# Patient Record
Sex: FEMALE | Race: BLACK OR AFRICAN AMERICAN | Employment: OTHER | URBAN - METROPOLITAN AREA
[De-identification: names, ages, dates, MRNs, and addresses within clinical notes are randomized per-mention and may not be internally consistent; named-entity substitution may affect disease eponyms.]

---

## 2018-03-22 PROBLEM — E66.01 OBESITY, MORBID (HCC): Status: ACTIVE | Noted: 2018-03-22

## 2019-06-11 ENCOUNTER — HOSPITAL ENCOUNTER (EMERGENCY)
Age: 39
Discharge: HOME | End: 2019-06-11
Attending: EMERGENCY MEDICINE
Payer: OTHER MISCELLANEOUS

## 2019-06-11 ENCOUNTER — APPOINTMENT (OUTPATIENT)
Dept: GENERAL RADIOLOGY | Age: 39
End: 2019-06-11
Attending: EMERGENCY MEDICINE
Payer: OTHER MISCELLANEOUS

## 2019-06-11 VITALS
OXYGEN SATURATION: 99 % | WEIGHT: 293 LBS | BODY MASS INDEX: 41.02 KG/M2 | SYSTOLIC BLOOD PRESSURE: 132 MMHG | TEMPERATURE: 98 F | RESPIRATION RATE: 16 BRPM | HEART RATE: 82 BPM | HEIGHT: 71 IN | DIASTOLIC BLOOD PRESSURE: 82 MMHG

## 2019-06-11 DIAGNOSIS — S90.31XA CONTUSION OF RIGHT FOOT, INITIAL ENCOUNTER: Primary | ICD-10-CM

## 2019-06-11 DIAGNOSIS — R20.2 PARESTHESIA: ICD-10-CM

## 2019-06-11 PROCEDURE — 73630 X-RAY EXAM OF FOOT: CPT

## 2019-06-11 PROCEDURE — 99283 EMERGENCY DEPT VISIT LOW MDM: CPT | Performed by: EMERGENCY MEDICINE

## 2019-06-11 NOTE — DISCHARGE INSTRUCTIONS
Alternate 4 ibuprofen every 8 hours with 2 extra-strength tylenol every 6 hours    Patient Education        Contusion: Care Instructions  Your Care Instructions    Contusion is the medical term for a bruise. It is the result of a direct blow or an impact, such as a fall. Contusions are common sports injuries. Most people think of a bruise as a black-and-blue spot. This happens when small blood vessels get torn and leak blood under the skin. But bones, muscles, and organs can also get bruised. This may damage deep tissues but not cause a bruise you can see. The doctor will do a physical exam to find the location of your contusion. You may also have tests to make sure you do not have a more serious injury, such as a broken bone or nerve damage. These may include X-rays or other imaging tests like a CT scan or MRI. Deep-tissue contusions may cause pain and swelling. But if there is no serious damage, they will often get better in a few weeks with home treatment. The doctor has checked you carefully, but problems can develop later. If you notice any problems or new symptoms, get medical treatment right away. Follow-up care is a key part of your treatment and safety. Be sure to make and go to all appointments, and call your doctor if you are having problems. It's also a good idea to know your test results and keep a list of the medicines you take. How can you care for yourself at home? · Put ice or a cold pack on the sore area for 10 to 20 minutes at a time to stop swelling. Put a thin cloth between the ice pack and your skin. · Be safe with medicines. Read and follow all instructions on the label. ? If the doctor gave you a prescription medicine for pain, take it as prescribed. ? If you are not taking a prescription pain medicine, ask your doctor if you can take an over-the-counter medicine. · If you can, prop up the sore area on pillows as much as possible for the next few days.  Try to keep the sore area above the level of your heart. When should you call for help? Call your doctor now or seek immediate medical care if:    · Your pain gets worse.     · You have new or worse swelling.     · You have tingling, weakness, or numbness in the area near the contusion.     · The area near the contusion is cold or pale.    Watch closely for changes in your health, and be sure to contact your doctor if:    · You do not get better as expected. Where can you learn more? Go to http://leslye-dorian.info/. Enter J274 in the search box to learn more about \"Contusion: Care Instructions. \"  Current as of: September 23, 2018  Content Version: 11.9  © 0870-7340 [employer]. Care instructions adapted under license by [employer] (which disclaims liability or warranty for this information). If you have questions about a medical condition or this instruction, always ask your healthcare professional. [address] any warranty or liability for your use of this information. Patient Education        Numbness and Tingling: Care Instructions  Your Care Instructions    Many things can cause numbness or tingling. Swelling may put pressure on a nerve. This could cause you to lose feeling or have a pins-and-needles sensation on part of your body. Nerves may be damaged from trauma, toxins, or diseases, such as diabetes or multiple sclerosis (MS). Sometimes, though, the cause is not clear. If there is no clear reason for your symptoms, and you are not having any other symptoms, your doctor may suggest watching and waiting for a while to see if the numbness or tingling goes away on its own. Your doctor may want you to have blood or nerve tests to find the cause of your symptoms. Follow-up care is a key part of your treatment and safety. Be sure to make and go to all appointments, and call your doctor if you are having problems.  It's also a good idea to know your test results and keep a list of the medicines you take. How can you care for yourself at home? · If your doctor prescribes medicine, take it exactly as directed. Call your doctor if you think you are having a problem with your medicine. · If you have any swelling, put ice or a cold pack on the area for 10 to 20 minutes at a time. Put a thin cloth between the ice and your skin. When should you call for help? Call 911 anytime you think you may need emergency care. For example, call if:    · You have weakness, numbness, or tingling in both legs.     · You lose bowel or bladder control.     · You have symptoms of a stroke. These may include:  ? Sudden numbness, tingling, weakness, or loss of movement in your face, arm, or leg, especially on only one side of your body. ? Sudden vision changes. ? Sudden trouble speaking. ? Sudden confusion or trouble understanding simple statements. ? Sudden problems with walking or balance. ? A sudden, severe headache that is different from past headaches.    Watch closely for changes in your health, and be sure to contact your doctor if you have any problems, or if:    · You do not get better as expected. Where can you learn more? Go to http://leslye-dorian.info/. Enter W761 in the search box to learn more about \"Numbness and Tingling: Care Instructions. \"  Current as of: Renu 3, 2018  Content Version: 11.9  © 1169-4905 [employer]. Care instructions adapted under license by [employer] (which disclaims liability or warranty for this information). If you have questions about a medical condition or this instruction, always ask your healthcare professional. [address] any warranty or liability for your use of this information.

## 2019-06-11 NOTE — ED TRIAGE NOTES
Pt to ED c/o \"dropping a 40lb bag of medicine\" on her foot around 0900 today. Able to ambulate but with pain. Denies swelling but states \"it started getting numb and warm. \" Denies previous surgeries to affected foot.

## 2019-06-11 NOTE — ED NOTES
I have reviewed discharge instructions with the patient. The patient verbalized understanding. Patient left ED via Discharge Method: ambulatory to Home with self. Opportunity for questions and clarification provided. Patient given 0 scripts. To continue your aftercare when you leave the hospital, you may receive an automated call from our care team to check in on how you are doing. This is a free service and part of our promise to provide the best care and service to meet your aftercare needs.  If you have questions, or wish to unsubscribe from this service please call [phone]. Thank you for Choosing our [facility] Emergency Department.

## 2019-06-15 NOTE — ED PROVIDER NOTES
Chief complaint : Foot pain    HISTORY OF PRESENT ILLNESS :  Location : Right Dorsolateral    Quality : Throbbing    Quantity :     Timing : Just prior to arrival    Severity : Mild    Alleviating / exacerbating factors : Worse with ambulation    Associated Symptoms : Mild numbness           Past Medical History:   Diagnosis Date    Elevated blood pressure (not hypertension) 3/12/2014    Elevated blood pressure reading without diagnosis of hypertension 3/12/2014    HDL deficiency 2014    Recurrent respiratory infection 2015    Weight gain 3/12/2014       Past Surgical History:   Procedure Laterality Date    BREAST SURGERY PROCEDURE UNLISTED  2007    reduction    HX GYN   &         HX TUBAL LIGATION  2010         Family History:   Problem Relation Age of Onset    Hypertension Mother     Thyroid Disease Mother     Hypertension Maternal Grandmother     Elevated Lipids Maternal Grandmother     Diabetes Maternal Grandmother        Social History     Socioeconomic History    Marital status:      Spouse name: Not on file    Number of children: Not on file    Years of education: Not on file    Highest education level: Not on file   Occupational History    Not on file   Social Needs    Financial resource strain: Not on file    Food insecurity:     Worry: Not on file     Inability: Not on file    Transportation needs:     Medical: Not on file     Non-medical: Not on file   Tobacco Use    Smoking status: Never Smoker    Smokeless tobacco: Never Used   Substance and Sexual Activity    Alcohol use:  Yes     Alcohol/week: 0.0 - 0.5 oz    Drug use: No    Sexual activity: Not on file   Lifestyle    Physical activity:     Days per week: Not on file     Minutes per session: Not on file    Stress: Not on file   Relationships    Social connections:     Talks on phone: Not on file     Gets together: Not on file     Attends Denominational service: Not on file     Active member of club or organization: Not on file     Attends meetings of clubs or organizations: Not on file     Relationship status: Not on file    Intimate partner violence:     Fear of current or ex partner: Not on file     Emotionally abused: Not on file     Physically abused: Not on file     Forced sexual activity: Not on file   Other Topics Concern    Not on file   Social History Narrative    Not on file         ALLERGIES: Shellfish containing products    Review of Systems   Musculoskeletal: Positive for arthralgias. Negative for joint swelling. Neurological: Positive for numbness. Negative for weakness. Vitals:    06/11/19 1043 06/11/19 1127   BP: 138/84 132/82   Pulse: 84 82   Resp: 16 16   Temp: 98.2 °F (36.8 °C) 98 °F (36.7 °C)   SpO2: 97% 99%   Weight: 146.1 kg (322 lb)    Height: 5' 11\" (1.803 m)             Physical Exam   Constitutional: She is oriented to person, place, and time. She appears well-developed and well-nourished. No distress. HENT:   Head: Normocephalic and atraumatic. Eyes: Conjunctivae and EOM are normal. Right eye exhibits no discharge. Left eye exhibits no discharge. Neck: Normal range of motion. Neck supple. Pulmonary/Chest: Effort normal. No respiratory distress. Musculoskeletal: Normal range of motion. She exhibits tenderness. She exhibits no deformity. Right foot: There is tenderness. Feet:    Neurological: She is alert and oriented to person, place, and time. She has normal strength. She exhibits normal muscle tone. cni 2-12 grossly  Nl gait,  Nl speech     Skin: Skin is warm and dry. No rash noted. She is not diaphoretic. Psychiatric: She has a normal mood and affect. Her behavior is normal.   Nursing note and vitals reviewed.        MDM  Number of Diagnoses or Management Options  Contusion of right foot, initial encounter:   Paresthesia:   Diagnosis management comments: Medical decision making note:  Right foot pain  Negative x-ray  Suspect contusion  Anticipate paresthesia should resolve spontaneously  This concludes the \"medical decision making note\" part of this emergency department visit note. Amount and/or Complexity of Data Reviewed  Tests in the radiology section of CPT®: reviewed and ordered (XR FOOT RT MIN 3 V   Final Result    IMPRESSION: Soft tissue swelling.  Small ossicle or avulsion injury along the anterior process of the calcaneus.     )           Procedures

## 2019-07-10 ENCOUNTER — HOSPITAL ENCOUNTER (EMERGENCY)
Age: 39
Discharge: HOME | End: 2019-07-10
Attending: EMERGENCY MEDICINE
Payer: COMMERCIAL

## 2019-07-10 ENCOUNTER — APPOINTMENT (OUTPATIENT)
Dept: CT IMAGING | Age: 39
End: 2019-07-10
Payer: COMMERCIAL

## 2019-07-10 VITALS
TEMPERATURE: 99.9 F | HEIGHT: 71 IN | RESPIRATION RATE: 16 BRPM | OXYGEN SATURATION: 98 % | WEIGHT: 293 LBS | HEART RATE: 78 BPM | DIASTOLIC BLOOD PRESSURE: 72 MMHG | SYSTOLIC BLOOD PRESSURE: 126 MMHG | BODY MASS INDEX: 41.02 KG/M2

## 2019-07-10 DIAGNOSIS — B34.9 VIRAL SYNDROME: Primary | ICD-10-CM

## 2019-07-10 LAB
ALBUMIN SERPL-MCNC: 3.6 G/DL (ref 3.5–5)
ALBUMIN/GLOB SERPL: 0.9 {RATIO} (ref 1.2–3.5)
ALP SERPL-CCNC: 42 U/L (ref 50–136)
ALT SERPL-CCNC: 148 U/L (ref 12–65)
ANION GAP SERPL CALC-SCNC: 6 MMOL/L (ref 7–16)
AST SERPL-CCNC: 95 U/L (ref 15–37)
BASOPHILS # BLD: 0 K/UL (ref 0–0.2)
BASOPHILS NFR BLD: 0 % (ref 0–2)
BILIRUB SERPL-MCNC: 0.4 MG/DL (ref 0.2–1.1)
BUN SERPL-MCNC: 12 MG/DL (ref 6–23)
CALCIUM SERPL-MCNC: 9.1 MG/DL (ref 8.3–10.4)
CHLORIDE SERPL-SCNC: 103 MMOL/L (ref 98–107)
CO2 SERPL-SCNC: 27 MMOL/L (ref 21–32)
CREAT SERPL-MCNC: 0.97 MG/DL (ref 0.6–1)
CRP SERPL-MCNC: 0.4 MG/DL (ref 0–0.9)
DEPRECATED S PYO AG THROAT QL EIA: NEGATIVE
DIFFERENTIAL METHOD BLD: ABNORMAL
EOSINOPHIL # BLD: 0.1 K/UL (ref 0–0.8)
EOSINOPHIL NFR BLD: 4 % (ref 0.5–7.8)
ERYTHROCYTE [DISTWIDTH] IN BLOOD BY AUTOMATED COUNT: 13.2 % (ref 11.9–14.6)
ERYTHROCYTE [SEDIMENTATION RATE] IN BLOOD: 41 MM/HR (ref 0–20)
FLUAV AG NPH QL IA: NEGATIVE
FLUBV AG NPH QL IA: NEGATIVE
GLOBULIN SER CALC-MCNC: 4.1 G/DL (ref 2.3–3.5)
GLUCOSE SERPL-MCNC: 77 MG/DL (ref 65–100)
HCG UR QL: NEGATIVE
HCT VFR BLD AUTO: 36.4 % (ref 35.8–46.3)
HGB BLD-MCNC: 12 G/DL (ref 11.7–15.4)
IMM GRANULOCYTES # BLD AUTO: 0 K/UL (ref 0–0.5)
IMM GRANULOCYTES NFR BLD AUTO: 0 % (ref 0–5)
LACTATE BLD-SCNC: 0.64 MMOL/L (ref 0.5–1.9)
LYMPHOCYTES # BLD: 1.1 K/UL (ref 0.5–4.6)
LYMPHOCYTES NFR BLD: 32 % (ref 13–44)
MCH RBC QN AUTO: 32.1 PG (ref 26.1–32.9)
MCHC RBC AUTO-ENTMCNC: 33 G/DL (ref 31.4–35)
MCV RBC AUTO: 97.3 FL (ref 79.6–97.8)
MONOCYTES # BLD: 0.5 K/UL (ref 0.1–1.3)
MONOCYTES NFR BLD: 14 % (ref 4–12)
NEUTS SEG # BLD: 1.6 K/UL (ref 1.7–8.2)
NEUTS SEG NFR BLD: 50 % (ref 43–78)
NRBC # BLD: 0 K/UL (ref 0–0.2)
PLATELET # BLD AUTO: 252 K/UL (ref 150–450)
PLATELET COMMENTS,PCOM: ADEQUATE
PMV BLD AUTO: 9.9 FL (ref 9.4–12.3)
POTASSIUM SERPL-SCNC: 3.8 MMOL/L (ref 3.5–5.1)
PROT SERPL-MCNC: 7.7 G/DL (ref 6.3–8.2)
RBC # BLD AUTO: 3.74 M/UL (ref 4.05–5.2)
RBC MORPH BLD: ABNORMAL
SODIUM SERPL-SCNC: 136 MMOL/L (ref 136–145)
SPECIMEN SOURCE: NORMAL
WBC # BLD AUTO: 3.3 K/UL (ref 4.3–11.1)
WBC MORPH BLD: ABNORMAL

## 2019-07-10 PROCEDURE — 87880 STREP A ASSAY W/OPTIC: CPT

## 2019-07-10 PROCEDURE — 85652 RBC SED RATE AUTOMATED: CPT

## 2019-07-10 PROCEDURE — 80053 COMPREHEN METABOLIC PANEL: CPT

## 2019-07-10 PROCEDURE — 81003 URINALYSIS AUTO W/O SCOPE: CPT | Performed by: EMERGENCY MEDICINE

## 2019-07-10 PROCEDURE — 87081 CULTURE SCREEN ONLY: CPT

## 2019-07-10 PROCEDURE — 85025 COMPLETE CBC W/AUTO DIFF WBC: CPT

## 2019-07-10 PROCEDURE — 99285 EMERGENCY DEPT VISIT HI MDM: CPT | Performed by: EMERGENCY MEDICINE

## 2019-07-10 PROCEDURE — 81025 URINE PREGNANCY TEST: CPT

## 2019-07-10 PROCEDURE — 87804 INFLUENZA ASSAY W/OPTIC: CPT

## 2019-07-10 PROCEDURE — 96360 HYDRATION IV INFUSION INIT: CPT | Performed by: EMERGENCY MEDICINE

## 2019-07-10 PROCEDURE — 74011250637 HC RX REV CODE- 250/637: Performed by: PHYSICIAN ASSISTANT

## 2019-07-10 PROCEDURE — 74011250636 HC RX REV CODE- 250/636: Performed by: PHYSICIAN ASSISTANT

## 2019-07-10 PROCEDURE — 70450 CT HEAD/BRAIN W/O DYE: CPT

## 2019-07-10 PROCEDURE — 83605 ASSAY OF LACTIC ACID: CPT

## 2019-07-10 PROCEDURE — 86140 C-REACTIVE PROTEIN: CPT

## 2019-07-10 RX ORDER — BUTALBITAL, ACETAMINOPHEN AND CAFFEINE 50; 325; 40 MG/1; MG/1; MG/1
1 TABLET ORAL
Qty: 40 TAB | Refills: 0 | Status: SHIPPED | OUTPATIENT
Start: 2019-07-10

## 2019-07-10 RX ORDER — SODIUM CHLORIDE 9 MG/ML
1000 INJECTION, SOLUTION INTRAVENOUS ONCE
Status: COMPLETED | OUTPATIENT
Start: 2019-07-10 | End: 2019-07-10

## 2019-07-10 RX ORDER — IBUPROFEN 800 MG/1
800 TABLET ORAL
Status: COMPLETED | OUTPATIENT
Start: 2019-07-10 | End: 2019-07-10

## 2019-07-10 RX ORDER — IBUPROFEN 800 MG/1
600 TABLET ORAL
Qty: 20 TAB | Refills: 0 | Status: SHIPPED | OUTPATIENT
Start: 2019-07-10

## 2019-07-10 RX ORDER — METHYLPREDNISOLONE 4 MG/1
4 TABLET ORAL
Qty: 1 DOSE PACK | Refills: 0 | Status: SHIPPED | OUTPATIENT
Start: 2019-07-10 | End: 2019-08-20 | Stop reason: ALTCHOICE

## 2019-07-10 RX ORDER — BUTALBITAL, ACETAMINOPHEN AND CAFFEINE 50; 325; 40 MG/1; MG/1; MG/1
2 TABLET ORAL
Status: COMPLETED | OUTPATIENT
Start: 2019-07-10 | End: 2019-07-10

## 2019-07-10 RX ADMIN — IBUPROFEN 800 MG: 800 TABLET, FILM COATED ORAL at 12:14

## 2019-07-10 RX ADMIN — BUTALBITAL, ACETAMINOPHEN AND CAFFEINE 2 TABLET: 50; 325; 40 TABLET ORAL at 12:14

## 2019-07-10 RX ADMIN — SODIUM CHLORIDE 1000 ML: 900 INJECTION, SOLUTION INTRAVENOUS at 12:14

## 2019-07-10 NOTE — ED NOTES
I have reviewed discharge instructions with the patient. The patient verbalized understanding. Patient left ED via Discharge Method: ambulatory to Home with self    Opportunity for questions and clarification provided. Patient given 3 scripts. To continue your aftercare when you leave the hospital, you may receive an automated call from our care team to check in on how you are doing. This is a free service and part of our promise to provide the best care and service to meet your aftercare needs.  If you have questions, or wish to unsubscribe from this service please call [phone]. Thank you for Choosing our [facility] Emergency Department.

## 2019-07-10 NOTE — DISCHARGE INSTRUCTIONS
Patient Education        Viral Infections: Care Instructions  Your Care Instructions    You don't feel well, but it's not clear what's causing it. You may have a viral infection. Viruses cause many illnesses, such as the common cold, influenza, fever, rashes, and the diarrhea, nausea, and vomiting that are often called \"stomach flu. \" You may wonder if antibiotic medicines could make you feel better. But antibiotics only treat infections caused by bacteria. They don't work on viruses. The good news is that viral infections usually aren't serious. Most will go away in a few days without medical treatment. In the meantime, there are a few things you can do to make yourself more comfortable. Follow-up care is a key part of your treatment and safety. Be sure to make and go to all appointments, and call your doctor if you are having problems. It's also a good idea to know your test results and keep a list of the medicines you take. How can you care for yourself at home? · Get plenty of rest if you feel tired. · Take an over-the-counter pain medicine if needed, such as acetaminophen (Tylenol), ibuprofen (Advil, Motrin), or naproxen (Aleve). Read and follow all instructions on the label. · Be careful when taking over-the-counter cold or flu medicines and Tylenol at the same time. Many of these medicines have acetaminophen, which is Tylenol. Read the labels to make sure that you are not taking more than the recommended dose. Too much acetaminophen (Tylenol) can be harmful. · Drink plenty of fluids, enough so that your urine is light yellow or clear like water. If you have kidney, heart, or liver disease and have to limit fluids, talk with your doctor before you increase the amount of fluids you drink. · Stay home from work, school, and other public places while you have a fever. When should you call for help? Call 911 anytime you think you may need emergency care.  For example, call if:    · You have severe trouble breathing.     · You passed out (lost consciousness).    Call your doctor now or seek immediate medical care if:    · You seem to be getting much sicker.     · You have a new or higher fever.     · You have blood in your stools.     · You have new belly pain, or your pain gets worse.     · You have a new rash.    Watch closely for changes in your health, and be sure to contact your doctor if:    · You start to get better and then get worse.     · You do not get better as expected. Where can you learn more? Go to http://leslye-dorian.info/. Enter V099 in the search box to learn more about \"Viral Infections: Care Instructions. \"  Current as of: July 30, 2018  Content Version: 11.9  © 1698-6702 [employer]. Care instructions adapted under license by [employer] (which disclaims liability or warranty for this information). If you have questions about a medical condition or this instruction, always ask your healthcare professional. [place] [address] any warranty or liability for your use of this information. Patient Education   Headache: After Your Visit to the Emergency Room  Your Care Instructions  Headaches have many possible causes. Most headaches are not a sign of serious problems and will get better on their own. Home treatment may help you feel better soon. Even though you have been released from the emergency room, you still need to watch for any problems. The doctor carefully checked you. But sometimes problems can develop later. If you have new symptoms, or if your symptoms do not get better, return to the emergency room or call your doctor right away. A visit to the emergency room is only one step in your treatment. Even if you feel better, you still need to do what your doctor recommends, such as going to all suggested follow-up appointments and taking medicines exactly as directed.  This will help you recover and help prevent future problems. How can you care for yourself at home? · Have someone drive you home if you have taken pain medicine. Do not drive yourself. · Rest in a quiet, dark room until your headache is gone. Close your eyes, and try to relax or go to sleep. Do not watch TV or read. · Put ice or a cold pack on the area for 10 to 20 minutes at a time. Put a thin cloth between the ice and your skin. · Use a warm, moist towel or a heating pad set on low to relax tight shoulder and neck muscles. · Have someone gently massage your neck and shoulders. · Take pain medicines exactly as directed. ¨ If the doctor gave you a prescription medicine for pain, take it as prescribed. ¨ If you are not taking a prescription pain medicine, ask your doctor if you can take an over-the-counter medicine. · Limit your caffeine intake to 1 to 2 cups a day. People who drink a lot of caffeine often get a headache several hours after their last drink of caffeine. Or they may wake up with a headache that is relieved by drinking caffeine. Cut down slowly to avoid caffeine-withdrawal headaches. · Seek help if you have depression or anxiety. Your headaches may be linked to these conditions. When should you call for help? Call 911 if:  · You have signs of a stroke. These may include:  ¨ Sudden numbness, paralysis, or weakness in your face, arm, or leg, especially on only one side of your body. ¨ New problems with walking or balance. ¨ Sudden vision changes. ¨ Drooling or slurred speech. ¨ New problems speaking or understanding simple statements, or feeling confused. ¨ A sudden, severe headache that is different from past headaches. · You have other symptoms that you think are a medical emergency. Return to the emergency room now if:  · You get a fever and a stiff neck. · Your headache gets worse. · You have new nausea and vomiting, or you cannot keep down food or liquids.   Call your doctor today if:  · Your headache does not get better within 1 to 2 days. · Your headaches get worse or happen more often. Where can you learn more? Go to MashMe.TV.be  Enter D038 in the search box to learn more about \"Headache: After Your Visit to the Emergency Room. \"   © 6959-6890 Healthwise, Incorporated. Care instructions adapted under license by [facility] (which disclaims liability or warranty for this information). This care instruction is for use with your licensed healthcare professional. If you have questions about a medical condition or this instruction, always ask your healthcare professional. [address] any warranty or liability for your use of this information.   Content Version: 9.3.26251; Last Revised: October 20, 2011

## 2019-07-10 NOTE — LETTER
[facility] EMERGENCY DEPT 
[address] [place] [address] [place] [id number] 
[phone] Work/School Note Date: 7/10/2019 To Whom It May concern: 
 
Roberto Ocampo was seen and treated today in the emergency room by the following provider(s): 
Attending Provider: Angel Luis Baires MD 
Physician Assistant: ESTHER Miller. Roberto Ocampo may return to work on 07/15/19. Sincerely, ESTHER Steve

## 2019-07-10 NOTE — ED PROVIDER NOTES
Patient is here with body aches, right-sided headache, pressure behind right eye, back pain and myalgias that started on . She went to an urgent care on Monday where they told her she had a migraine and gave her 2 different shots(kenalog and toradol), which seemed to help a little bit. She felt a little better yesterday but today she feels the same. She has not had any visual changes, nausea, vomiting, trouble speaking, chest pain, shortness of breath, abdominal pain. She has been urinating frequently. She has no medical problems, swelling to the arms or legs, dyspnea on exertion, orthopnea, or other new symptoms. She was ambulatory to the stretcher without difficulty and well-hydrated. The history is provided by the patient. Generalized Body Aches   This is a new problem. The current episode started more than 2 days ago (3 days). The problem has not changed since onset. Associated symptoms include headaches. Pertinent negatives include no chest pain, no abdominal pain and no shortness of breath. Nothing aggravates the symptoms. Nothing relieves the symptoms. Treatments tried: 2 shots, 800 mg ibuprofen.         Past Medical History:   Diagnosis Date    Elevated blood pressure (not hypertension) 3/12/2014    Elevated blood pressure reading without diagnosis of hypertension 3/12/2014    HDL deficiency 2014    Recurrent respiratory infection 2015    Weight gain 3/12/2014       Past Surgical History:   Procedure Laterality Date    BREAST SURGERY PROCEDURE UNLISTED      reduction    HX GYN   &         HX TUBAL LIGATION  2010         Family History:   Problem Relation Age of Onset    Hypertension Mother     Thyroid Disease Mother     Hypertension Maternal Grandmother     Elevated Lipids Maternal Grandmother     Diabetes Maternal Grandmother        Social History     Socioeconomic History    Marital status:      Spouse name: Not on file    Number of children: Not on file    Years of education: Not on file    Highest education level: Not on file   Occupational History    Not on file   Social Needs    Financial resource strain: Not on file    Food insecurity:     Worry: Not on file     Inability: Not on file    Transportation needs:     Medical: Not on file     Non-medical: Not on file   Tobacco Use    Smoking status: Never Smoker    Smokeless tobacco: Never Used   Substance and Sexual Activity    Alcohol use: Yes     Alcohol/week: 0.0 - 0.5 oz    Drug use: No    Sexual activity: Not on file   Lifestyle    Physical activity:     Days per week: Not on file     Minutes per session: Not on file    Stress: Not on file   Relationships    Social connections:     Talks on phone: Not on file     Gets together: Not on file     Attends Latter day service: Not on file     Active member of club or organization: Not on file     Attends meetings of clubs or organizations: Not on file     Relationship status: Not on file    Intimate partner violence:     Fear of current or ex partner: Not on file     Emotionally abused: Not on file     Physically abused: Not on file     Forced sexual activity: Not on file   Other Topics Concern    Not on file   Social History Narrative    Not on file         ALLERGIES: Shellfish containing products    Review of Systems   Constitutional: Positive for chills. HENT: Positive for sore throat. Eyes: Negative. Respiratory: Negative. Negative for shortness of breath. Cardiovascular: Negative. Negative for chest pain. Gastrointestinal: Negative. Negative for abdominal pain. Genitourinary: Positive for urgency. Negative for dysuria. Musculoskeletal: Positive for back pain. Skin: Negative. Neurological: Positive for headaches. Psychiatric/Behavioral: Negative. All other systems reviewed and are negative.       Vitals:    07/10/19 1000   BP: 138/80   Pulse: 90   Resp: 16   Temp: 99.9 °F (37.7 °C)   SpO2: 97% Weight: 149.7 kg (330 lb)   Height: 5' 11\" (1.803 m)            Physical Exam   Constitutional: She is oriented to person, place, and time. She appears well-developed and well-nourished. HENT:   Head: Normocephalic and atraumatic. Right Ear: External ear normal.   Left Ear: External ear normal.   Nose: Nose normal.   Mouth/Throat: Oropharynx is clear and moist.   Eyes: Pupils are equal, round, and reactive to light. Conjunctivae, EOM and lids are normal. Lids are everted and swept, no foreign bodies found. Neck: Normal range of motion. Neck supple. Cardiovascular: Normal rate, regular rhythm, normal heart sounds and intact distal pulses. Pulmonary/Chest: Effort normal and breath sounds normal.   Abdominal: Soft. Bowel sounds are normal.   Musculoskeletal: Normal range of motion. Back:    Neurological: She is alert and oriented to person, place, and time. She has normal strength and normal reflexes. No cranial nerve deficit or sensory deficit. She displays a negative Romberg sign. GCS eye subscore is 4. GCS verbal subscore is 5. GCS motor subscore is 6. Reflex Scores:       Tricep reflexes are 2+ on the right side and 2+ on the left side. Bicep reflexes are 2+ on the right side and 2+ on the left side. Brachioradialis reflexes are 2+ on the right side and 2+ on the left side. Patellar reflexes are 2+ on the right side and 2+ on the left side. Achilles reflexes are 2+ on the right side and 2+ on the left side. Skin: Skin is warm and dry. Psychiatric: She has a normal mood and affect. Her behavior is normal. Judgment and thought content normal.   Nursing note and vitals reviewed.        MDM  Number of Diagnoses or Management Options  Viral syndrome:      Amount and/or Complexity of Data Reviewed  Clinical lab tests: ordered and reviewed  Tests in the radiology section of CPT®: ordered and reviewed    Risk of Complications, Morbidity, and/or Mortality  Presenting problems: moderate  Diagnostic procedures: moderate  Management options: moderate    Patient Progress  Patient progress: improved         Procedures      1:18 PM Spoke with Dr. Yoni Bright regarding patient. We discussed the patients history, physical exam, labs and CT findings. Patient appears to have a viral syndrome at this time. She is feeling better after medication. I will send her home with ibuprofen and fioricet. 3:02 PM Spoke with Dr. Viral Reilly, Ophthalmologist, regarding the patient. We discussed the history, physical exam, lab and CT findings. He states they can see her next week in the office for follow-up. He does not believe that this appears to be carotid arteritis. Patient does appear to have a viral infection. She is stable for discharge at this time and was told to return to the ED if worsening in any way. The patient was observed in the ED.     Results Reviewed:      Recent Results (from the past 24 hour(s))   STREP AG SCREEN, GROUP A    Collection Time: 07/10/19 10:28 AM   Result Value Ref Range    Group A Strep Ag ID NEGATIVE  NEG     INFLUENZA A & B AG (RAPID TEST)    Collection Time: 07/10/19 10:30 AM   Result Value Ref Range    Influenza A Ag NEGATIVE  NEG      Influenza B Ag NEGATIVE  NEG      Source NASOPHARYNGEAL     HCG URINE, QL. - POC    Collection Time: 07/10/19 10:36 AM   Result Value Ref Range    Pregnancy test,urine (POC) NEGATIVE  NEG     CBC WITH AUTOMATED DIFF    Collection Time: 07/10/19 12:20 PM   Result Value Ref Range    WBC 3.3 (L) 4.3 - 11.1 K/uL    RBC 3.74 (L) 4.05 - 5.2 M/uL    HGB 12.0 11.7 - 15.4 g/dL    HCT 36.4 35.8 - 46.3 %    MCV 97.3 79.6 - 97.8 FL    MCH 32.1 26.1 - 32.9 PG    MCHC 33.0 31.4 - 35.0 g/dL    RDW 13.2 11.9 - 14.6 %    PLATELET 063 810 - 525 K/uL    MPV 9.9 9.4 - 12.3 FL    ABSOLUTE NRBC 0.00 0.0 - 0.2 K/uL    NEUTROPHILS 50 43 - 78 %    LYMPHOCYTES 32 13 - 44 %    MONOCYTES 14 (H) 4.0 - 12.0 %    EOSINOPHILS 4 0.5 - 7.8 %    BASOPHILS 0 0.0 - 2.0 % IMMATURE GRANULOCYTES 0 0.0 - 5.0 %    ABS. NEUTROPHILS 1.6 (L) 1.7 - 8.2 K/UL    ABS. LYMPHOCYTES 1.1 0.5 - 4.6 K/UL    ABS. MONOCYTES 0.5 0.1 - 1.3 K/UL    ABS. EOSINOPHILS 0.1 0.0 - 0.8 K/UL    ABS. BASOPHILS 0.0 0.0 - 0.2 K/UL    ABS. IMM. GRANS. 0.0 0.0 - 0.5 K/UL    RBC COMMENTS NORMOCYTIC/NORMOCHROMIC      WBC COMMENTS Result Confirmed By Smear      PLATELET COMMENTS ADEQUATE      DF AUTOMATED     METABOLIC PANEL, COMPREHENSIVE    Collection Time: 07/10/19 12:20 PM   Result Value Ref Range    Sodium 136 136 - 145 mmol/L    Potassium 3.8 3.5 - 5.1 mmol/L    Chloride 103 98 - 107 mmol/L    CO2 27 21 - 32 mmol/L    Anion gap 6 (L) 7 - 16 mmol/L    Glucose 77 65 - 100 mg/dL    BUN 12 6 - 23 MG/DL    Creatinine 0.97 0.6 - 1.0 MG/DL    GFR est AA >60 >60 ml/min/1.73m2    GFR est non-AA >60 >60 ml/min/1.73m2    Calcium 9.1 8.3 - 10.4 MG/DL    Bilirubin, total 0.4 0.2 - 1.1 MG/DL    ALT (SGPT) 148 (H) 12 - 65 U/L    AST (SGOT) 95 (H) 15 - 37 U/L    Alk. phosphatase 42 (L) 50 - 136 U/L    Protein, total 7.7 6.3 - 8.2 g/dL    Albumin 3.6 3.5 - 5.0 g/dL    Globulin 4.1 (H) 2.3 - 3.5 g/dL    A-G Ratio 0.9 (L) 1.2 - 3.5     C REACTIVE PROTEIN, QT    Collection Time: 07/10/19 12:20 PM   Result Value Ref Range    C-Reactive protein 0.4 0.0 - 0.9 mg/dL   SED RATE, AUTOMATED    Collection Time: 07/10/19 12:20 PM   Result Value Ref Range    Sed rate, automated 41 (H) 0 - 20 mm/hr   POC LACTIC ACID    Collection Time: 07/10/19 12:28 PM   Result Value Ref Range    Lactic Acid (POC) 0.64 0.5 - 1.9 mmol/L     CT HEAD WO CONT   Final Result   Impression:  Normal unenhanced CT of the brain for age. No acute intracranial   abnormality. I discussed the results of all labs, procedures, radiographs, and treatments with the patient and available family. Treatment plan is agreed upon and the patient is ready for discharge. All voiced understanding of the discharge plan and medication instructions or changes as appropriate. Questions about treatment in the ED were answered. All were encouraged to return should symptoms worsen or new problems develop.

## 2019-07-10 NOTE — ED TRIAGE NOTES
Pt states headache with body aches since Sunday and dx with a migraine at Urgent care on MOnday. States she got two injections there and the pain went away but has been back since yesterday. States pain behind eyes.

## 2019-07-13 LAB
BACTERIA SPEC CULT: NORMAL
SERVICE CMNT-IMP: NORMAL

## 2019-08-13 ENCOUNTER — HOSPITAL ENCOUNTER (OUTPATIENT)
Dept: LAB | Age: 39
Discharge: HOME | End: 2019-08-13
Payer: COMMERCIAL

## 2019-08-13 LAB — TSH SERPL DL<=0.005 MIU/L-ACNC: 2.89 UIU/ML (ref 0.36–3.74)

## 2019-08-13 PROCEDURE — 84443 ASSAY THYROID STIM HORMONE: CPT

## 2019-08-20 PROBLEM — R68.83 CHILLS: Status: ACTIVE | Noted: 2018-01-02

## 2019-08-27 ENCOUNTER — HOSPITAL ENCOUNTER (OUTPATIENT)
Dept: ULTRASOUND IMAGING | Age: 39
Discharge: HOME | End: 2019-08-27
Attending: INTERNAL MEDICINE
Payer: COMMERCIAL

## 2019-08-27 DIAGNOSIS — E04.9 GOITER: ICD-10-CM

## 2019-08-27 PROCEDURE — 76536 US EXAM OF HEAD AND NECK: CPT

## 2019-08-27 NOTE — PROGRESS NOTES
Your thyroid ultrasound looks fine. Your gland is a bit large, and has a tiny cyst in the right, which is not concerning. NO harmful changes. Glad we checked, for certainty.

## 2020-07-15 ENCOUNTER — HOSPITAL ENCOUNTER (OUTPATIENT)
Dept: LAB | Age: 40
Discharge: HOME | End: 2020-07-15
Payer: COMMERCIAL

## 2020-07-15 DIAGNOSIS — Z00.00 LABORATORY EXAM ORDERED AS PART OF ROUTINE GENERAL MEDICAL EXAMINATION: ICD-10-CM

## 2020-07-15 DIAGNOSIS — R00.2 HEART PALPITATIONS: ICD-10-CM

## 2020-07-15 DIAGNOSIS — R73.9 HYPERGLYCEMIA: ICD-10-CM

## 2020-07-15 LAB
ALBUMIN SERPL-MCNC: 3.8 G/DL (ref 3.5–5)
ALBUMIN/GLOB SERPL: 0.9 {RATIO} (ref 1.2–3.5)
ALP SERPL-CCNC: 41 U/L (ref 50–136)
ALT SERPL-CCNC: 136 U/L (ref 12–65)
ANION GAP SERPL CALC-SCNC: 3 MMOL/L (ref 7–16)
AST SERPL-CCNC: 83 U/L (ref 15–37)
BASOPHILS # BLD: 0 K/UL (ref 0–0.2)
BASOPHILS NFR BLD: 0 % (ref 0–2)
BILIRUB DIRECT SERPL-MCNC: 0.2 MG/DL
BILIRUB SERPL-MCNC: 0.8 MG/DL (ref 0.2–1.1)
BUN SERPL-MCNC: 12 MG/DL (ref 6–23)
CALCIUM SERPL-MCNC: 9.1 MG/DL (ref 8.3–10.4)
CHLORIDE SERPL-SCNC: 110 MMOL/L (ref 98–107)
CHOLEST SERPL-MCNC: 160 MG/DL
CO2 SERPL-SCNC: 28 MMOL/L (ref 21–32)
CREAT SERPL-MCNC: 0.97 MG/DL (ref 0.6–1)
DIFFERENTIAL METHOD BLD: ABNORMAL
EOSINOPHIL # BLD: 0.1 K/UL (ref 0–0.8)
EOSINOPHIL NFR BLD: 3 % (ref 0.5–7.8)
ERYTHROCYTE [DISTWIDTH] IN BLOOD BY AUTOMATED COUNT: 13.3 % (ref 11.9–14.6)
EST. AVERAGE GLUCOSE BLD GHB EST-MCNC: 126 MG/DL
GLOBULIN SER CALC-MCNC: 4.4 G/DL (ref 2.3–3.5)
GLUCOSE SERPL-MCNC: 86 MG/DL (ref 65–100)
HBA1C MFR BLD: 6 % (ref 4.8–6)
HCT VFR BLD AUTO: 41.4 % (ref 35.8–46.3)
HDLC SERPL-MCNC: 39 MG/DL (ref 40–60)
HDLC SERPL: 4.1 {RATIO}
HGB BLD-MCNC: 13.9 G/DL (ref 11.7–15.4)
IMM GRANULOCYTES # BLD AUTO: 0 K/UL (ref 0–0.5)
IMM GRANULOCYTES NFR BLD AUTO: 0 % (ref 0–5)
LDLC SERPL CALC-MCNC: 96 MG/DL
LIPID PROFILE,FLP: ABNORMAL
LYMPHOCYTES # BLD: 2.4 K/UL (ref 0.5–4.6)
LYMPHOCYTES NFR BLD: 46 % (ref 13–44)
MAGNESIUM SERPL-MCNC: 2.3 MG/DL (ref 1.8–2.4)
MCH RBC QN AUTO: 32.9 PG (ref 26.1–32.9)
MCHC RBC AUTO-ENTMCNC: 33.6 G/DL (ref 31.4–35)
MCV RBC AUTO: 97.9 FL (ref 79.6–97.8)
MONOCYTES # BLD: 0.6 K/UL (ref 0.1–1.3)
MONOCYTES NFR BLD: 12 % (ref 4–12)
NEUTS SEG # BLD: 2 K/UL (ref 1.7–8.2)
NEUTS SEG NFR BLD: 38 % (ref 43–78)
NRBC # BLD: 0 K/UL (ref 0–0.2)
PLATELET # BLD AUTO: 283 K/UL (ref 150–450)
PMV BLD AUTO: 10.1 FL (ref 9.4–12.3)
POTASSIUM SERPL-SCNC: 3.9 MMOL/L (ref 3.5–5.1)
PROT SERPL-MCNC: 8.2 G/DL (ref 6.3–8.2)
RBC # BLD AUTO: 4.23 M/UL (ref 4.05–5.2)
SODIUM SERPL-SCNC: 141 MMOL/L (ref 136–145)
T4 FREE SERPL-MCNC: 1 NG/DL (ref 0.78–1.46)
TRIGL SERPL-MCNC: 125 MG/DL (ref 35–150)
TSH SERPL DL<=0.005 MIU/L-ACNC: 2.57 UIU/ML (ref 0.36–3.74)
VLDLC SERPL CALC-MCNC: 25 MG/DL (ref 6–23)
WBC # BLD AUTO: 5.1 K/UL (ref 4.3–11.1)

## 2020-07-15 PROCEDURE — 80048 BASIC METABOLIC PNL TOTAL CA: CPT

## 2020-07-15 PROCEDURE — 85025 COMPLETE CBC W/AUTO DIFF WBC: CPT

## 2020-07-15 PROCEDURE — 83735 ASSAY OF MAGNESIUM: CPT

## 2020-07-15 PROCEDURE — 84443 ASSAY THYROID STIM HORMONE: CPT

## 2020-07-15 PROCEDURE — 83036 HEMOGLOBIN GLYCOSYLATED A1C: CPT

## 2020-07-15 PROCEDURE — 36415 COLL VENOUS BLD VENIPUNCTURE: CPT

## 2020-07-15 PROCEDURE — 80061 LIPID PANEL: CPT

## 2020-07-15 PROCEDURE — 84439 ASSAY OF FREE THYROXINE: CPT

## 2020-07-15 PROCEDURE — 80076 HEPATIC FUNCTION PANEL: CPT

## 2020-07-19 NOTE — PROGRESS NOTES
Your labs show your liver tests are elevated again, as they were a year again in the ER. We repeated them one month later and they were normal.  We never had a good explanation for the elevation last year, but this time I think they might be from your fall--muscle injury can also release AST and ALT into your bloodstream.  We usually think of these as liver enzymes, but they are muscle enzymes too. We need to recheck this upcoming week to see  How they change over time. I don't think they relate to your palpitations. Your a1c average glucose has increased from 5.8 to 6.0. Thyroid, hemoglobin, cholesterol, potassium, magnesium, and kidneys are fine. I see you've been hooked up for the holter--waiting to see those results! Call us [phone] if you have new or worsening symptoms. Have your repeat blood test done at [facility] nonfasting next week.

## 2020-07-22 ENCOUNTER — HOSPITAL ENCOUNTER (OUTPATIENT)
Dept: LAB | Age: 40
Discharge: HOME | End: 2020-07-22
Payer: COMMERCIAL

## 2020-07-22 DIAGNOSIS — R74.8 ELEVATED LIVER ENZYMES: ICD-10-CM

## 2020-07-22 LAB
ALBUMIN SERPL-MCNC: 3.6 G/DL (ref 3.5–5)
ALBUMIN/GLOB SERPL: 0.8 {RATIO} (ref 1.2–3.5)
ALP SERPL-CCNC: 43 U/L (ref 50–136)
ALT SERPL-CCNC: 118 U/L (ref 12–65)
AST SERPL-CCNC: 70 U/L (ref 15–37)
BILIRUB DIRECT SERPL-MCNC: 0.2 MG/DL
BILIRUB SERPL-MCNC: 0.6 MG/DL (ref 0.2–1.1)
CRP SERPL-MCNC: <0.3 MG/DL (ref 0–0.9)
ERYTHROCYTE [SEDIMENTATION RATE] IN BLOOD: 34 MM/HR (ref 0–20)
GLOBULIN SER CALC-MCNC: 4.3 G/DL (ref 2.3–3.5)
PROT SERPL-MCNC: 7.9 G/DL (ref 6.3–8.2)

## 2020-07-22 PROCEDURE — 80076 HEPATIC FUNCTION PANEL: CPT

## 2020-07-22 PROCEDURE — 36415 COLL VENOUS BLD VENIPUNCTURE: CPT

## 2020-07-22 PROCEDURE — 85652 RBC SED RATE AUTOMATED: CPT

## 2020-07-22 PROCEDURE — 86140 C-REACTIVE PROTEIN: CPT

## 2020-08-02 NOTE — PROGRESS NOTES
Your liver tests are slightly improved. Keep our 8/20 appt to regroup. Hope you received my note about the holter as well.

## 2020-09-10 ENCOUNTER — HOSPITAL ENCOUNTER (OUTPATIENT)
Dept: MAMMOGRAPHY | Age: 40
Discharge: HOME | End: 2020-09-10
Attending: INTERNAL MEDICINE
Payer: COMMERCIAL

## 2020-09-10 ENCOUNTER — HOSPITAL ENCOUNTER (OUTPATIENT)
Dept: ULTRASOUND IMAGING | Age: 40
Discharge: HOME | End: 2020-09-10
Attending: INTERNAL MEDICINE
Payer: COMMERCIAL

## 2020-09-10 DIAGNOSIS — R79.89 ELEVATED LIVER FUNCTION TESTS: ICD-10-CM

## 2020-09-10 DIAGNOSIS — R92.2 DENSE BREAST TISSUE ON MAMMOGRAM: ICD-10-CM

## 2020-09-10 DIAGNOSIS — Z12.39 SCREENING FOR BREAST CANCER: ICD-10-CM

## 2020-09-10 PROCEDURE — 77063 BREAST TOMOSYNTHESIS BI: CPT

## 2020-09-10 PROCEDURE — 76705 ECHO EXAM OF ABDOMEN: CPT

## 2020-09-20 NOTE — PROGRESS NOTES
Your abdominal ultrasound shows no problem except infiltration of the liver by fatty tissue--which can certainly cause the elevated liver tests you've had. I note that the blood tests I wanted haven't yet been scheduled yet--let\"s do that before I leave this office. I'll ask Sandra to schedule this. Expect a call to schedule.

## 2020-09-28 ENCOUNTER — HOSPITAL ENCOUNTER (OUTPATIENT)
Dept: MAMMOGRAPHY | Age: 40
Discharge: HOME | End: 2020-09-28
Attending: INTERNAL MEDICINE
Payer: COMMERCIAL

## 2020-09-28 DIAGNOSIS — R92.8 ABNORMAL SCREENING MAMMOGRAM: ICD-10-CM

## 2020-09-28 PROCEDURE — 76642 ULTRASOUND BREAST LIMITED: CPT

## 2021-09-01 ENCOUNTER — TRANSCRIBE ORDER (OUTPATIENT)
Dept: SCHEDULING | Age: 41
End: 2021-09-01

## 2021-09-01 DIAGNOSIS — Z12.31 VISIT FOR SCREENING MAMMOGRAM: Primary | ICD-10-CM

## 2021-09-17 ENCOUNTER — HOSPITAL ENCOUNTER (OUTPATIENT)
Dept: MAMMOGRAPHY | Age: 41
Discharge: HOME | End: 2021-09-17
Attending: INTERNAL MEDICINE
Payer: COMMERCIAL

## 2021-09-17 DIAGNOSIS — Z12.31 VISIT FOR SCREENING MAMMOGRAM: ICD-10-CM

## 2021-09-17 PROCEDURE — 77063 BREAST TOMOSYNTHESIS BI: CPT

## 2021-12-25 ENCOUNTER — APPOINTMENT (OUTPATIENT)
Dept: GENERAL RADIOLOGY | Age: 41
End: 2021-12-25
Attending: EMERGENCY MEDICINE
Payer: COMMERCIAL

## 2021-12-25 ENCOUNTER — HOSPITAL ENCOUNTER (EMERGENCY)
Age: 41
Discharge: HOME | End: 2021-12-26
Attending: EMERGENCY MEDICINE
Payer: COMMERCIAL

## 2021-12-25 DIAGNOSIS — U07.1 PNEUMONIA DUE TO COVID-19 VIRUS: ICD-10-CM

## 2021-12-25 DIAGNOSIS — J12.82 PNEUMONIA DUE TO COVID-19 VIRUS: ICD-10-CM

## 2021-12-25 DIAGNOSIS — U07.1 COVID-19 VIRUS INFECTION: Primary | ICD-10-CM

## 2021-12-25 LAB
ALBUMIN SERPL-MCNC: 3.5 G/DL (ref 3.5–5)
ALBUMIN/GLOB SERPL: 0.7 {RATIO} (ref 1.2–3.5)
ALP SERPL-CCNC: 42 U/L (ref 50–136)
ALT SERPL-CCNC: 48 U/L (ref 12–65)
ANION GAP SERPL CALC-SCNC: 5 MMOL/L (ref 7–16)
AST SERPL-CCNC: 46 U/L (ref 15–37)
BASOPHILS # BLD: 0 K/UL (ref 0–0.2)
BASOPHILS NFR BLD: 0 % (ref 0–2)
BILIRUB SERPL-MCNC: 0.4 MG/DL (ref 0.2–1.1)
BUN SERPL-MCNC: 13 MG/DL (ref 6–23)
CALCIUM SERPL-MCNC: 9.2 MG/DL (ref 8.3–10.4)
CHLORIDE SERPL-SCNC: 103 MMOL/L (ref 98–107)
CO2 SERPL-SCNC: 30 MMOL/L (ref 21–32)
CREAT SERPL-MCNC: 0.95 MG/DL (ref 0.6–1)
DIFFERENTIAL METHOD BLD: ABNORMAL
EOSINOPHIL # BLD: 0 K/UL (ref 0–0.8)
EOSINOPHIL NFR BLD: 1 % (ref 0.5–7.8)
ERYTHROCYTE [DISTWIDTH] IN BLOOD BY AUTOMATED COUNT: 12.4 % (ref 11.9–14.6)
GLOBULIN SER CALC-MCNC: 4.7 G/DL (ref 2.3–3.5)
GLUCOSE SERPL-MCNC: 94 MG/DL (ref 65–100)
HCT VFR BLD AUTO: 38.9 % (ref 35.8–46.3)
HGB BLD-MCNC: 13.1 G/DL (ref 11.7–15.4)
IMM GRANULOCYTES # BLD AUTO: 0 K/UL (ref 0–0.5)
IMM GRANULOCYTES NFR BLD AUTO: 1 % (ref 0–5)
LYMPHOCYTES # BLD: 2.3 K/UL (ref 0.5–4.6)
LYMPHOCYTES NFR BLD: 51 % (ref 13–44)
MAGNESIUM SERPL-MCNC: 2.2 MG/DL (ref 1.8–2.4)
MCH RBC QN AUTO: 32 PG (ref 26.1–32.9)
MCHC RBC AUTO-ENTMCNC: 33.7 G/DL (ref 31.4–35)
MCV RBC AUTO: 94.9 FL (ref 79.6–97.8)
MONOCYTES # BLD: 0.7 K/UL (ref 0.1–1.3)
MONOCYTES NFR BLD: 15 % (ref 4–12)
NEUTS SEG # BLD: 1.4 K/UL (ref 1.7–8.2)
NEUTS SEG NFR BLD: 32 % (ref 43–78)
NRBC # BLD: 0 K/UL (ref 0–0.2)
PLATELET # BLD AUTO: 240 K/UL (ref 150–450)
PLATELET COMMENTS,PCOM: ADEQUATE
PMV BLD AUTO: 9.9 FL (ref 9.4–12.3)
POTASSIUM SERPL-SCNC: 3.1 MMOL/L (ref 3.5–5.1)
PROT SERPL-MCNC: 8.2 G/DL (ref 6.3–8.2)
RBC # BLD AUTO: 4.1 M/UL (ref 4.05–5.2)
RBC MORPH BLD: ABNORMAL
SODIUM SERPL-SCNC: 138 MMOL/L (ref 136–145)
WBC # BLD AUTO: 4.4 K/UL (ref 4.3–11.1)
WBC MORPH BLD: ABNORMAL

## 2021-12-25 PROCEDURE — 93005 ELECTROCARDIOGRAM TRACING: CPT | Performed by: EMERGENCY MEDICINE

## 2021-12-25 PROCEDURE — 99285 EMERGENCY DEPT VISIT HI MDM: CPT

## 2021-12-25 PROCEDURE — 94640 AIRWAY INHALATION TREATMENT: CPT

## 2021-12-25 PROCEDURE — 74011000250 HC RX REV CODE- 250: Performed by: EMERGENCY MEDICINE

## 2021-12-25 PROCEDURE — M0243 CASIRIVI AND IMDEVI INFUSION: HCPCS

## 2021-12-25 PROCEDURE — 80053 COMPREHEN METABOLIC PANEL: CPT

## 2021-12-25 PROCEDURE — 83735 ASSAY OF MAGNESIUM: CPT

## 2021-12-25 PROCEDURE — 71045 X-RAY EXAM CHEST 1 VIEW: CPT

## 2021-12-25 PROCEDURE — 74011250637 HC RX REV CODE- 250/637: Performed by: EMERGENCY MEDICINE

## 2021-12-25 PROCEDURE — 85025 COMPLETE CBC W/AUTO DIFF WBC: CPT

## 2021-12-25 RX ORDER — BENZONATATE 100 MG/1
100 CAPSULE ORAL
Status: COMPLETED | OUTPATIENT
Start: 2021-12-25 | End: 2021-12-25

## 2021-12-25 RX ORDER — DOXYCYCLINE 100 MG/1
100 CAPSULE ORAL
Status: COMPLETED | OUTPATIENT
Start: 2021-12-25 | End: 2021-12-25

## 2021-12-25 RX ORDER — BENZONATATE 100 MG/1
100 CAPSULE ORAL
Qty: 21 CAPSULE | Refills: 0 | Status: SHIPPED | OUTPATIENT
Start: 2021-12-25 | End: 2022-01-01

## 2021-12-25 RX ORDER — ALBUTEROL SULFATE 90 UG/1
2 AEROSOL, METERED RESPIRATORY (INHALATION)
Qty: 1 EACH | Refills: 0 | Status: SHIPPED | OUTPATIENT
Start: 2021-12-25 | End: 2022-01-01

## 2021-12-25 RX ORDER — IPRATROPIUM BROMIDE AND ALBUTEROL SULFATE 2.5; .5 MG/3ML; MG/3ML
3 SOLUTION RESPIRATORY (INHALATION)
Status: COMPLETED | OUTPATIENT
Start: 2021-12-25 | End: 2021-12-25

## 2021-12-25 RX ORDER — SODIUM CHLORIDE 0.9 % (FLUSH) 0.9 %
5-10 SYRINGE (ML) INJECTION AS NEEDED
Status: DISCONTINUED | OUTPATIENT
Start: 2021-12-25 | End: 2021-12-26 | Stop reason: HOSPADM

## 2021-12-25 RX ORDER — DOXYCYCLINE HYCLATE 100 MG
100 TABLET ORAL 2 TIMES DAILY
Qty: 14 TABLET | Refills: 0 | Status: SHIPPED | OUTPATIENT
Start: 2021-12-25 | End: 2022-01-01

## 2021-12-25 RX ORDER — SODIUM CHLORIDE 0.9 % (FLUSH) 0.9 %
5-10 SYRINGE (ML) INJECTION EVERY 8 HOURS
Status: DISCONTINUED | OUTPATIENT
Start: 2021-12-25 | End: 2021-12-26 | Stop reason: HOSPADM

## 2021-12-25 RX ADMIN — POTASSIUM BICARBONATE 40 MEQ: 782 TABLET, EFFERVESCENT ORAL at 23:39

## 2021-12-25 RX ADMIN — Medication 5 ML: at 22:00

## 2021-12-25 RX ADMIN — DOXYCYCLINE HYCLATE 100 MG: 100 CAPSULE, GELATIN COATED ORAL at 23:52

## 2021-12-25 RX ADMIN — BENZONATATE 100 MG: 100 CAPSULE ORAL at 23:52

## 2021-12-25 RX ADMIN — IPRATROPIUM BROMIDE AND ALBUTEROL SULFATE 3 ML: .5; 3 SOLUTION RESPIRATORY (INHALATION) at 23:52

## 2021-12-25 NOTE — Clinical Note
[address] [facility] EMERGENCY DEPT   [facility] [id number] [phone]    Work/School Note    Date: 12/25/2021     To Whom It May concern:    Quan Pearson was evaluated by the following provider(s):  Attending Provider: Tay Salter MD.   Fouziaa Duglas virus is suspected. Per the CDC guidelines we recommend home isolation until the following conditions are all met:    1. At least 10 days have passed since symptoms first appeared and  2. At least 24 hours have passed since last fever without the use of fever-reducing medications and  3.  Symptoms (e.g., cough, shortness of breath) have improved      Sincerely,          Arnel Herrera MD

## 2021-12-25 NOTE — Clinical Note
[address] [facility] EMERGENCY DEPT   [facility] [address] [phone]    Work/School Note    Date: 12/25/2021     To Whom It May concern:    Mk Santana was evaluated by the following provider(s):  Attending Provider: Farideh Collins MD.   Alka Gee virus is suspected. Per the CDC guidelines we recommend home isolation until the following conditions are all met:    1. At least 10 days have passed since symptoms first appeared and  2. At least 24 hours have passed since last fever without the use of fever-reducing medications and  3.  Symptoms (e.g., cough, shortness of breath) have improved      Sincerely,          Delmar Paredes MD

## 2021-12-26 VITALS
RESPIRATION RATE: 30 BRPM | TEMPERATURE: 98.9 F | BODY MASS INDEX: 41.02 KG/M2 | HEIGHT: 71 IN | HEART RATE: 83 BPM | WEIGHT: 293 LBS | OXYGEN SATURATION: 94 % | SYSTOLIC BLOOD PRESSURE: 127 MMHG | DIASTOLIC BLOOD PRESSURE: 76 MMHG

## 2021-12-26 LAB
ATRIAL RATE: 73 BPM
CALCULATED P AXIS, ECG09: 58 DEGREES
CALCULATED R AXIS, ECG10: 72 DEGREES
CALCULATED T AXIS, ECG11: 25 DEGREES
COVID-19 RAPID TEST, COVR: DETECTED
DIAGNOSIS, 93000: NORMAL
FLUAV AG NPH QL IA: NEGATIVE
FLUBV AG NPH QL IA: NEGATIVE
P-R INTERVAL, ECG05: 179 MS
Q-T INTERVAL, ECG07: 374 MS
QRS DURATION, ECG06: 139 MS
QTC CALCULATION (BEZET), ECG08: 410 MS
SOURCE, COVRS: ABNORMAL
SPECIMEN SOURCE: NORMAL
VENTRICULAR RATE, ECG03: 72 BPM

## 2021-12-26 PROCEDURE — 87635 SARS-COV-2 COVID-19 AMP PRB: CPT

## 2021-12-26 PROCEDURE — 74011000258 HC RX REV CODE- 258: Performed by: EMERGENCY MEDICINE

## 2021-12-26 PROCEDURE — 87804 INFLUENZA ASSAY W/OPTIC: CPT

## 2021-12-26 PROCEDURE — 74011000636 HC RX REV CODE- 636: Performed by: EMERGENCY MEDICINE

## 2021-12-26 RX ADMIN — IMDEVIMAB: 1332 INJECTION, SOLUTION, CONCENTRATE INTRAVENOUS at 02:08

## 2021-12-26 NOTE — ED PROVIDER NOTES
40 yo F with PMHx of HTN, obesity presents with c/o nasal congestion, cough, mylagias, SOB over the past several days that has worsened. States she was seen at Urgent care on Monday and was diagnosed with bronchitis; states she was not prescribed any medications. States she did not receive COVID-19 vaccines. Denies chest pain, hemoptysis, abdominal pain, sore throat, dizziness, weakness, hemoptysis. Rates symptoms as moderate, worsening. States she recently tested for COVID this past weekend and was informed her results were negative. Reports hx of BTL. The history is provided by the patient. No  was used. Shortness of Breath  This is a new problem. The problem occurs continuously. The current episode started more than 2 days ago. Associated symptoms include a fever, rhinorrhea and cough. Pertinent negatives include no headaches, no sore throat, no neck pain, no wheezing, no chest pain, no vomiting, no abdominal pain, no rash and no leg swelling.         Past Medical History:   Diagnosis Date    Elevated blood pressure (not hypertension) 3/12/2014    Elevated blood pressure reading without diagnosis of hypertension 3/12/2014    HDL deficiency 2014    Recurrent respiratory infection 2015    Weight gain 3/12/2014       Past Surgical History:   Procedure Laterality Date    HX GYN   &         HX TUBAL LIGATION  2010    [place] BREAST SURGERY PROCEDURE UNLISTED  2007    reduction         Family History:   Problem Relation Age of Onset    Hypertension Mother     Thyroid Disease Mother     Hypertension Maternal Grandmother     Elevated Lipids Maternal Grandmother     Diabetes Maternal Grandmother        Social History     Socioeconomic History    Marital status:      Spouse name: Not on file    Number of children: Not on file    Years of education: Not on file    Highest education level: Not on file   Occupational History    Not on file   Tobacco Use    Smoking status: Never Smoker    Smokeless tobacco: Never Used   Substance and Sexual Activity    Alcohol use: Yes     Alcohol/week: 0.0 - 0.8 standard drinks    Drug use: No    Sexual activity: Not on file   Other Topics Concern    Not on file   Social History Narrative    Not on file     Social Determinants of Health     Financial Resource Strain:     Difficulty of Paying Living Expenses: Not on file   Food Insecurity:     Worried About Running Out of Food in the Last Year: Not on file    Salazar of Food in the Last Year: Not on file   Transportation Needs:     Lack of Transportation (Medical): Not on file    Lack of Transportation (Non-Medical): Not on file   Physical Activity:     Days of Exercise per Week: Not on file    Minutes of Exercise per Session: Not on file   Stress:     Feeling of Stress : Not on file   Social Connections:     Frequency of Communication with Friends and Family: Not on file    Frequency of Social Gatherings with Friends and Family: Not on file    Attends Oriental orthodox Services: Not on file    Active Member of [address] [place] [address] or Organizations: Not on file    Attends Club or Organization Meetings: Not on file    Marital Status: Not on file   Intimate Partner Violence:     Fear of Current or Ex-Partner: Not on file    Emotionally Abused: Not on file    Physically Abused: Not on file    Sexually Abused: Not on file   Housing Stability:     Unable to Pay for Housing in the Last Year: Not on file    Number of [place] in the Last Year: Not on file    Unstable Housing in the Last Year: Not on file         ALLERGIES: Shellfish containing products    Review of Systems   Constitutional: Positive for chills and fever. HENT: Positive for congestion and rhinorrhea. Negative for sore throat, trouble swallowing and voice change. Respiratory: Positive for cough and shortness of breath. Negative for wheezing and stridor.     Cardiovascular: Negative for chest pain and leg swelling. Gastrointestinal: Negative for abdominal pain, constipation, nausea and vomiting. Genitourinary: Negative for dysuria and flank pain. Musculoskeletal: Positive for myalgias. Negative for neck pain and neck stiffness. Skin: Negative for color change and rash. Neurological: Negative for dizziness, syncope, weakness, light-headedness and headaches. Hematological: Does not bruise/bleed easily. Vitals:    12/25/21 2133 12/25/21 2256 12/25/21 2258   BP: 139/88  (!) 154/75   Pulse: 89  76   Resp: 20  22   Temp: 98.5 °F (36.9 °C)  98.8 °F (37.1 °C)   SpO2: 98% 97% 96%   Weight: 137.9 kg (304 lb)     Height: 5' 11\" (1.803 m)              Physical Exam  Vitals and nursing note reviewed. Constitutional:       Appearance: She is well-developed. Comments: Non-toxic in appearance. HENT:      Head: Normocephalic. Nose: Congestion present. Mouth/Throat:      Mouth: Mucous membranes are moist.      Pharynx: Oropharynx is clear. No oropharyngeal exudate. Eyes:      Extraocular Movements: Extraocular movements intact. Pupils: Pupils are equal, round, and reactive to light. Cardiovascular:      Rate and Rhythm: Normal rate. Pulses: Normal pulses. Heart sounds: Normal heart sounds. Pulmonary:      Effort: Pulmonary effort is normal. No accessory muscle usage or respiratory distress. Breath sounds: No stridor. Comments: Coarse breath sounds noted to lung bases. Abdominal:      Palpations: Abdomen is soft. Tenderness: There is no abdominal tenderness. There is no guarding or rebound. Musculoskeletal:         General: Normal range of motion. Cervical back: Normal range of motion. Right lower leg: No tenderness. No edema. Left lower leg: No tenderness. No edema. Comments: No calf TTP. No calf TTP. Skin:     General: Skin is warm. Findings: No rash. Neurological:      General: No focal deficit present.       Mental Status: She is alert and oriented to person, place, and time. Cranial Nerves: No cranial nerve deficit. MDM  Number of Diagnoses or Management Options  COVID-19 virus infection: new and requires workup  Pneumonia due to COVID-19 virus: new and requires workup  Diagnosis management comments: Sats stable. No desat with ambulation. RR inaccurate. Duoneb given. Potassium repleted. CXR w/ mild patchy density in right midlung. Given doxycycline po. Later COVID & flu testing obtained w/ resultant + covid. Symptoms <10 days. BMI > 25. Discussed treatment w/ Regeneron. Discussed risks/benefits of treatment. Pt in agreement to receive regeneron. Pt with her own pulse ox at home. Instructed to return if sats <90%. Instructed to closely monitor and given return precautions. Pt in agreement with plan. Amount and/or Complexity of Data Reviewed  Clinical lab tests: ordered and reviewed  Tests in the radiology section of CPT®: ordered and reviewed  Tests in the medicine section of CPT®: ordered and reviewed      ED Course as of 12/26/21 0705   Sat Dec 25, 2021   2236 CXR FINDINGS:     Mild patchy density in the right midlung. Left lung is relatively clear. No  pneumothorax, pulmonary edema or pleural effusion. Cardiomediastinal silhouette  is within normal limits. Surrounding bones are intact.        IMPRESSION     1. Small patchy opacity in the right midlung, likely atelectasis or developing  consolidation/infiltrate. [DF]   Sun Dec 26, 2021   0114 COVID-19 rapid test(!!): Detected [DF]      ED Course User Index  [DF] Barbara Meeks MD       EKG    Date/Time: 12/25/2021 11:58 PM  Performed by: Barbara Meeks MD  Authorized by:  Barbara Meeks MD     ECG reviewed by ED Physician in the absence of a cardiologist: yes    Rate:     ECG rate:  72    ECG rate assessment: normal    Rhythm:     Rhythm: sinus rhythm    Ectopy:     Ectopy: none    QRS:     QRS axis:  Normal    QRS intervals:  Normal  Conduction:     Conduction: normal    ST segments:     ST segments:  Normal  T waves:     T waves: normal                     Results Include:    Recent Results (from the past 24 hour(s))   CBC WITH AUTOMATED DIFF    Collection Time: 12/25/21  9:37 PM   Result Value Ref Range    WBC 4.4 4.3 - 11.1 K/uL    RBC 4.10 4.05 - 5.2 M/uL    HGB 13.1 11.7 - 15.4 g/dL    HCT 38.9 35.8 - 46.3 %    MCV 94.9 79.6 - 97.8 FL    MCH 32.0 26.1 - 32.9 PG    MCHC 33.7 31.4 - 35.0 g/dL    RDW 12.4 11.9 - 14.6 %    PLATELET 404 931 - 682 K/uL    MPV 9.9 9.4 - 12.3 FL    ABSOLUTE NRBC 0.00 0.0 - 0.2 K/uL    NEUTROPHILS 32 (L) 43 - 78 %    LYMPHOCYTES 51 (H) 13 - 44 %    MONOCYTES 15 (H) 4.0 - 12.0 %    EOSINOPHILS 1 0.5 - 7.8 %    BASOPHILS 0 0.0 - 2.0 %    IMMATURE GRANULOCYTES 1 0.0 - 5.0 %    ABS. NEUTROPHILS 1.4 (L) 1.7 - 8.2 K/UL    ABS. LYMPHOCYTES 2.3 0.5 - 4.6 K/UL    ABS. MONOCYTES 0.7 0.1 - 1.3 K/UL    ABS. EOSINOPHILS 0.0 0.0 - 0.8 K/UL    ABS. BASOPHILS 0.0 0.0 - 0.2 K/UL    ABS. IMM. GRANS. 0.0 0.0 - 0.5 K/UL    RBC COMMENTS NORMOCYTIC/NORMOCHROMIC      WBC COMMENTS OCCASIONAL      PLATELET COMMENTS ADEQUATE      DF AUTOMATED     METABOLIC PANEL, COMPREHENSIVE    Collection Time: 12/25/21  9:37 PM   Result Value Ref Range    Sodium 138 136 - 145 mmol/L    Potassium 3.1 (L) 3.5 - 5.1 mmol/L    Chloride 103 98 - 107 mmol/L    CO2 30 21 - 32 mmol/L    Anion gap 5 (L) 7 - 16 mmol/L    Glucose 94 65 - 100 mg/dL    BUN 13 6 - 23 MG/DL    Creatinine 0.95 0.6 - 1.0 MG/DL    GFR est AA >60 >60 ml/min/1.73m2    GFR est non-AA >60 >60 ml/min/1.73m2    Calcium 9.2 8.3 - 10.4 MG/DL    Bilirubin, total 0.4 0.2 - 1.1 MG/DL    ALT (SGPT) 48 12 - 65 U/L    AST (SGOT) 46 (H) 15 - 37 U/L    Alk.  phosphatase 42 (L) 50 - 136 U/L    Protein, total 8.2 6.3 - 8.2 g/dL    Albumin 3.5 3.5 - 5.0 g/dL    Globulin 4.7 (H) 2.3 - 3.5 g/dL    A-G Ratio 0.7 (L) 1.2 - 3.5     MAGNESIUM    Collection Time: 12/25/21  9:37 PM   Result Value Ref Range    Magnesium 2.2 1.8 - 2.4 mg/dL     Sean Hernandez MD; 12/25/2021 @11:42 PM Voice dictation software was used during the making of this note. This software is not perfect and grammatical and other typographical errors may be present.   This note has not been proofread for errors.  ===================================================================

## 2021-12-26 NOTE — ED NOTES
I have reviewed discharge instructions with the patient. The patient verbalized understanding. Patient left ED via Discharge Method: ambulatory to Home with self  Opportunity for questions and clarification provided. Patient given 3 scripts. To continue your aftercare when you leave the hospital, you may receive an automated call from our care team to check in on how you are doing. This is a free service and part of our promise to provide the best care and service to meet your aftercare needs.  If you have questions, or wish to unsubscribe from this service please call [phone]. Thank you for Choosing our [facility].

## 2021-12-26 NOTE — ED TRIAGE NOTES
Pt states she has been since Monday states she was dx with bronchitis.  Pt states she has been coughing up stuff and feels like she cant get it all up     States that she was tested for covid and the flu 6 days ago and was negative     otc medication not helping

## 2021-12-26 NOTE — DISCHARGE INSTRUCTIONS
Schedule close follow-up. Take antibiotic and cough medication as prescribed. Closely monitor pulse oximetry at home. O2 sats less than 90% return to ED. Schedule close follow-up with primary care physician.   Follow  CDC/[facility] guidelines in regards to self-isolation/quarantine

## 2021-12-28 ENCOUNTER — PATIENT OUTREACH (OUTPATIENT)
Dept: CASE MANAGEMENT | Age: 41
End: 2021-12-28

## 2021-12-28 NOTE — PROGRESS NOTES
Date/Time:  12/28/2021 11:59 AM   Call within 2 business days of discharge: Yes   Attempted to reach patient by telephone. Left HIPPA compliant message requesting a return call.

## 2022-03-19 PROBLEM — R68.83 CHILLS: Status: ACTIVE | Noted: 2018-01-02

## 2022-03-19 PROBLEM — E66.01 OBESITY, MORBID (HCC): Status: ACTIVE | Noted: 2018-03-22

## 2022-12-01 ENCOUNTER — HOSPITAL ENCOUNTER (OUTPATIENT)
Dept: MAMMOGRAPHY | Age: 42
Discharge: HOME | End: 2022-12-01
Payer: COMMERCIAL

## 2022-12-01 DIAGNOSIS — Z12.31 ENCOUNTER FOR SCREENING MAMMOGRAM FOR MALIGNANT NEOPLASM OF BREAST: ICD-10-CM

## 2022-12-01 PROCEDURE — 77063 BREAST TOMOSYNTHESIS BI: CPT

## 2024-01-24 ENCOUNTER — HOSPITAL ENCOUNTER (OUTPATIENT)
Dept: MAMMOGRAPHY | Age: 44
Discharge: HOME | End: 2024-01-27
Payer: COMMERCIAL

## 2024-01-24 VITALS — BODY MASS INDEX: 41.02 KG/M2 | WEIGHT: 293 LBS | HEIGHT: 71 IN

## 2024-01-24 DIAGNOSIS — Z12.31 SCREENING MAMMOGRAM FOR HIGH-RISK PATIENT: ICD-10-CM

## 2024-01-24 PROCEDURE — 77063 BREAST TOMOSYNTHESIS BI: CPT

## 2025-01-07 ENCOUNTER — TRANSCRIBE ORDERS (OUTPATIENT)
Dept: SCHEDULING | Age: 45
End: 2025-01-07

## 2025-01-07 DIAGNOSIS — Z12.31 ENCOUNTER FOR SCREENING MAMMOGRAM FOR MALIGNANT NEOPLASM OF BREAST: Primary | ICD-10-CM

## 2025-02-20 ENCOUNTER — HOSPITAL ENCOUNTER (OUTPATIENT)
Dept: MAMMOGRAPHY | Age: 45
Discharge: HOME | End: 2025-02-23
Payer: COMMERCIAL

## 2025-02-20 VITALS — HEIGHT: 71 IN | BODY MASS INDEX: 41.02 KG/M2 | WEIGHT: 293 LBS

## 2025-02-20 DIAGNOSIS — Z12.31 ENCOUNTER FOR SCREENING MAMMOGRAM FOR MALIGNANT NEOPLASM OF BREAST: ICD-10-CM

## 2025-02-20 PROCEDURE — 77063 BREAST TOMOSYNTHESIS BI: CPT
